# Patient Record
Sex: FEMALE | Race: BLACK OR AFRICAN AMERICAN | Employment: FULL TIME | URBAN - METROPOLITAN AREA
[De-identification: names, ages, dates, MRNs, and addresses within clinical notes are randomized per-mention and may not be internally consistent; named-entity substitution may affect disease eponyms.]

---

## 2024-04-26 ENCOUNTER — HOSPITAL ENCOUNTER (EMERGENCY)
Age: 59
Discharge: HOME | End: 2024-04-26
Attending: STUDENT IN AN ORGANIZED HEALTH CARE EDUCATION/TRAINING PROGRAM
Payer: COMMERCIAL

## 2024-04-26 VITALS
HEIGHT: 63 IN | OXYGEN SATURATION: 99 % | RESPIRATION RATE: 16 BRPM | TEMPERATURE: 98 F | SYSTOLIC BLOOD PRESSURE: 166 MMHG | HEART RATE: 104 BPM | DIASTOLIC BLOOD PRESSURE: 91 MMHG | WEIGHT: 196 LBS | BODY MASS INDEX: 34.73 KG/M2

## 2024-04-26 DIAGNOSIS — S01.81XA FACIAL LACERATION, INITIAL ENCOUNTER: Primary | ICD-10-CM

## 2024-04-26 PROCEDURE — 90714 TD VACC NO PRESV 7 YRS+ IM: CPT | Performed by: STUDENT IN AN ORGANIZED HEALTH CARE EDUCATION/TRAINING PROGRAM

## 2024-04-26 PROCEDURE — 99284 EMERGENCY DEPT VISIT MOD MDM: CPT

## 2024-04-26 PROCEDURE — 6360000002 HC RX W HCPCS: Performed by: STUDENT IN AN ORGANIZED HEALTH CARE EDUCATION/TRAINING PROGRAM

## 2024-04-26 PROCEDURE — 90471 IMMUNIZATION ADMIN: CPT | Performed by: STUDENT IN AN ORGANIZED HEALTH CARE EDUCATION/TRAINING PROGRAM

## 2024-04-26 PROCEDURE — 12011 RPR F/E/E/N/L/M 2.5 CM/<: CPT

## 2024-04-26 RX ORDER — AMLODIPINE BESYLATE 10 MG/1
10 TABLET ORAL DAILY
COMMUNITY

## 2024-04-26 RX ADMIN — CLOSTRIDIUM TETANI TOXOID ANTIGEN (FORMALDEHYDE INACTIVATED) AND CORYNEBACTERIUM DIPHTHERIAE TOXOID ANTIGEN (FORMALDEHYDE INACTIVATED) 0.5 ML: 5; 2 INJECTION, SUSPENSION INTRAMUSCULAR at 23:14

## 2024-04-26 ASSESSMENT — PAIN DESCRIPTION - ORIENTATION: ORIENTATION: RIGHT

## 2024-04-26 ASSESSMENT — PAIN SCALES - GENERAL: PAINLEVEL_OUTOF10: 4

## 2024-04-26 ASSESSMENT — PAIN DESCRIPTION - LOCATION: LOCATION: EYE

## 2024-04-27 NOTE — ED PROVIDER NOTES
Donnie Gaines [facility]  Emergency Department    DISPOSITION Decision To Discharge 04/26/2024 11:21:20 PM       ICD-10-CM    1. Facial laceration, initial encounter  S01.81XA [facility]        ED Course     ED Course as of 04/26/24 2332 Fri Apr 26, 2024   [id number] presents with superficial skin avulsion to lateral aspect of right eye.  Patient intact.  EOM intact.  Has a small superficial flap to superior aspect of eyelid.  Flap is too small to be amenable to suture as it is too close of proximity to angle of eye and not amenable for tissue adhesive.  The lower eyelid laceration does align more closely.  Unfortunately this clsoe to the globe makes area not amenable to suture as well. Through shared decision making with patient, opted for tissue adhesive partial closure. Was able to apply small amount of dermabond to lower lid skin avulsion. Will keep upper eyelid tissue avulsion in place to aid in healing. Tetanus updated. She is established at [facility] and I recommended f/u in morning or within 48 hours. Have also placed urgent referral. Return precautions given. [ER]      ED Course User Index  [ER] Mauro Fernandez MD     Data Reviewed and Analyzed:  1 acute, uncomplicated illness or injury.    I independently ordered and reviewed each unique test.        ELLA Dodson is a 59 y.o. female with a history of none who presents to the ED with complaint of laceration. Was at work this evening when she turned her head and inadvertently ran into a saw blade.  Noted minor laceration to lateral aspect of right eyelids.  No vision changes.  No double vision or vision loss.  Unsure of last tetanus    History   No past medical history on file.  No past surgical history on file.  No family history on file.  No Known Allergies    Physical Exam     Vitals:    04/26/24 2030   BP: (!) 166/91   Pulse: (!) 104   Resp: 16   Temp: 98 °F (36.7 °C)   TempSrc: Oral   SpO2: 99%

## 2024-04-27 NOTE — ED NOTES
Patient mobility status  with no difficulty. Provider aware     I have reviewed discharge instructions with the patient.  The patient verbalized understanding.    Patient left ED via Discharge Method: ambulatory to Home with  self .    Opportunity for questions and clarification provided.     Patient given 0 scripts.     ;

## 2024-04-27 NOTE — ED TRIAGE NOTES
Patient has laceration to right eye. Pt states she was at work and a blade hit her eye.  No bleeding noted but pt has lace to right eye lid